# Patient Record
Sex: FEMALE | Race: WHITE | NOT HISPANIC OR LATINO | Employment: OTHER | URBAN - METROPOLITAN AREA
[De-identification: names, ages, dates, MRNs, and addresses within clinical notes are randomized per-mention and may not be internally consistent; named-entity substitution may affect disease eponyms.]

---

## 2017-03-06 ENCOUNTER — TRANSCRIBE ORDERS (OUTPATIENT)
Dept: ADMINISTRATIVE | Facility: HOSPITAL | Age: 77
End: 2017-03-06

## 2017-03-06 DIAGNOSIS — Z12.31 VISIT FOR SCREENING MAMMOGRAM: Primary | ICD-10-CM

## 2017-03-23 ENCOUNTER — APPOINTMENT (OUTPATIENT)
Dept: MAMMOGRAPHY | Facility: HOSPITAL | Age: 77
End: 2017-03-23

## 2017-04-12 ENCOUNTER — APPOINTMENT (OUTPATIENT)
Dept: MAMMOGRAPHY | Facility: HOSPITAL | Age: 77
End: 2017-04-12

## 2017-05-02 ENCOUNTER — APPOINTMENT (OUTPATIENT)
Dept: MAMMOGRAPHY | Facility: HOSPITAL | Age: 77
End: 2017-05-02

## 2017-05-15 ENCOUNTER — APPOINTMENT (OUTPATIENT)
Dept: MAMMOGRAPHY | Facility: HOSPITAL | Age: 77
End: 2017-05-15

## 2017-05-24 ENCOUNTER — APPOINTMENT (OUTPATIENT)
Dept: MAMMOGRAPHY | Facility: HOSPITAL | Age: 77
End: 2017-05-24

## 2017-06-09 ENCOUNTER — TRANSCRIBE ORDERS (OUTPATIENT)
Dept: ADMINISTRATIVE | Facility: HOSPITAL | Age: 77
End: 2017-06-09

## 2017-06-09 DIAGNOSIS — Z12.31 VISIT FOR SCREENING MAMMOGRAM: Primary | ICD-10-CM

## 2017-06-28 ENCOUNTER — HOSPITAL ENCOUNTER (OUTPATIENT)
Dept: MAMMOGRAPHY | Facility: HOSPITAL | Age: 77
Discharge: HOME | End: 2017-06-28
Admitting: INTERNAL MEDICINE

## 2017-06-28 DIAGNOSIS — Z12.31 VISIT FOR SCREENING MAMMOGRAM: ICD-10-CM

## 2017-06-28 PROCEDURE — G0202 SCR MAMMO BI INCL CAD: HCPCS

## 2017-06-28 PROCEDURE — 77063 BREAST TOMOSYNTHESIS BI: CPT

## 2017-06-28 PROCEDURE — 77063 BREAST TOMOSYNTHESIS BI: CPT | Performed by: RADIOLOGY

## 2017-06-28 PROCEDURE — G0202 SCR MAMMO BI INCL CAD: HCPCS | Performed by: RADIOLOGY

## 2018-03-13 ENCOUNTER — OFFICE VISIT (OUTPATIENT)
Dept: ORTHOPEDIC SURGERY | Facility: CLINIC | Age: 78
End: 2018-03-13

## 2018-03-13 VITALS
HEART RATE: 65 BPM | BODY MASS INDEX: 51.91 KG/M2 | SYSTOLIC BLOOD PRESSURE: 130 MMHG | WEIGHT: 293 LBS | DIASTOLIC BLOOD PRESSURE: 89 MMHG | HEIGHT: 63 IN

## 2018-03-13 DIAGNOSIS — M17.0 PRIMARY OSTEOARTHRITIS OF BOTH KNEES: Primary | ICD-10-CM

## 2018-03-13 DIAGNOSIS — R52 PAIN: ICD-10-CM

## 2018-03-13 PROCEDURE — 99204 OFFICE O/P NEW MOD 45 MIN: CPT | Performed by: ORTHOPAEDIC SURGERY

## 2018-03-13 PROCEDURE — 20610 DRAIN/INJ JOINT/BURSA W/O US: CPT | Performed by: ORTHOPAEDIC SURGERY

## 2018-03-13 RX ORDER — LOPERAMIDE HYDROCHLORIDE 2 MG/1
2 CAPSULE ORAL 4 TIMES DAILY PRN
COMMUNITY

## 2018-03-13 RX ORDER — WARFARIN SODIUM 5 MG/1
5 TABLET ORAL
COMMUNITY

## 2018-03-13 RX ORDER — PROPAFENONE HYDROCHLORIDE 225 MG/1
TABLET, FILM COATED ORAL
Refills: 2 | COMMUNITY
Start: 2018-03-08

## 2018-03-13 RX ORDER — ASPIRIN 81 MG/1
81 TABLET ORAL DAILY
COMMUNITY

## 2018-03-13 RX ORDER — IBUPROFEN 200 MG
200 TABLET ORAL EVERY 6 HOURS PRN
COMMUNITY

## 2018-03-13 RX ORDER — DOCUSATE SODIUM 100 MG/1
100 CAPSULE, LIQUID FILLED ORAL 2 TIMES DAILY
COMMUNITY

## 2018-03-13 RX ORDER — PANTOPRAZOLE SODIUM 40 MG/1
TABLET, DELAYED RELEASE ORAL
Refills: 1 | COMMUNITY
Start: 2017-12-18

## 2018-03-13 RX ORDER — ATORVASTATIN CALCIUM 40 MG/1
TABLET, FILM COATED ORAL
Refills: 2 | COMMUNITY
Start: 2017-12-18

## 2018-03-13 RX ORDER — UBIDECARENONE 100 MG
100 CAPSULE ORAL DAILY
COMMUNITY

## 2018-03-13 RX ORDER — NITROFURANTOIN MACROCRYSTALS 50 MG/1
CAPSULE ORAL
Refills: 5 | COMMUNITY
Start: 2018-03-08

## 2018-03-13 RX ORDER — PEPSIN/PAN ENZYME/BETAINE
TABLET ORAL
COMMUNITY

## 2018-03-13 RX ORDER — LIDOCAINE HYDROCHLORIDE 10 MG/ML
3 INJECTION, SOLUTION INFILTRATION; PERINEURAL
Status: COMPLETED | OUTPATIENT
Start: 2018-03-13 | End: 2018-03-13

## 2018-03-13 RX ORDER — HYDROCODONE BITARTRATE AND ACETAMINOPHEN 10; 325 MG/1; MG/1
TABLET ORAL
Refills: 0 | COMMUNITY
Start: 2018-02-15

## 2018-03-13 RX ORDER — POTASSIUM CHLORIDE 20 MEQ/1
TABLET, EXTENDED RELEASE ORAL
Refills: 10 | COMMUNITY
Start: 2018-03-01

## 2018-03-13 RX ORDER — CHOLECALCIFEROL (VITAMIN D3) 125 MCG
5 CAPSULE ORAL
COMMUNITY

## 2018-03-13 RX ORDER — LANOLIN ALCOHOL/MO/W.PET/CERES
1000 CREAM (GRAM) TOPICAL DAILY
COMMUNITY

## 2018-03-13 RX ORDER — FAMOTIDINE 20 MG/1
20 TABLET, FILM COATED ORAL 2 TIMES DAILY
COMMUNITY

## 2018-03-13 RX ORDER — METOPROLOL SUCCINATE 50 MG/1
TABLET, EXTENDED RELEASE ORAL
Refills: 10 | COMMUNITY
Start: 2018-02-12

## 2018-03-13 RX ORDER — VENLAFAXINE HYDROCHLORIDE 150 MG/1
CAPSULE, EXTENDED RELEASE ORAL
Refills: 3 | COMMUNITY
Start: 2018-02-19

## 2018-03-13 RX ORDER — TRIAMCINOLONE ACETONIDE 40 MG/ML
80 INJECTION, SUSPENSION INTRA-ARTICULAR; INTRAMUSCULAR
Status: COMPLETED | OUTPATIENT
Start: 2018-03-13 | End: 2018-03-13

## 2018-03-13 RX ORDER — BUMETANIDE 1 MG/1
TABLET ORAL
Refills: 11 | COMMUNITY
Start: 2018-01-15

## 2018-03-13 RX ADMIN — LIDOCAINE HYDROCHLORIDE 3 ML: 10 INJECTION, SOLUTION INFILTRATION; PERINEURAL at 15:55

## 2018-03-13 RX ADMIN — TRIAMCINOLONE ACETONIDE 80 MG: 40 INJECTION, SUSPENSION INTRA-ARTICULAR; INTRAMUSCULAR at 15:55

## 2018-03-13 RX ADMIN — LIDOCAINE HYDROCHLORIDE 3 ML: 10 INJECTION, SOLUTION INFILTRATION; PERINEURAL at 15:56

## 2018-03-13 RX ADMIN — TRIAMCINOLONE ACETONIDE 80 MG: 40 INJECTION, SUSPENSION INTRA-ARTICULAR; INTRAMUSCULAR at 15:56

## 2018-03-13 NOTE — PROGRESS NOTES
Procedure   Large Joint Arthrocentesis  Date/Time: 3/13/2018 3:56 PM  Consent given by: patient  Site marked: site marked  Timeout: Immediately prior to procedure a time out was called to verify the correct patient, procedure, equipment, support staff and site/side marked as required   Supporting Documentation  Indications: pain   Procedure Details  Location: knee - L knee  Preparation: Patient was prepped and draped in the usual sterile fashion  Needle size: 22 G  Approach: anteromedial  Medications administered: 3 mL lidocaine 1 %; 80 mg triamcinolone acetonide 40 MG/ML (Bupivacaine 0.25%, NDC: 4683-5765-34, LOT: [id number], EXP: 09/01/2019, 3cc)  Patient tolerance: patient tolerated the procedure well with no immediate complications

## 2018-03-13 NOTE — PROGRESS NOTES
Procedure   Large Joint Arthrocentesis  Date/Time: 3/13/2018 3:55 PM  Consent given by: patient  Site marked: site marked  Timeout: Immediately prior to procedure a time out was called to verify the correct patient, procedure, equipment, support staff and site/side marked as required   Supporting Documentation  Indications: pain   Procedure Details  Location: knee - R knee  Preparation: Patient was prepped and draped in the usual sterile fashion  Needle size: 22 G  Approach: anteromedial  Medications administered: 3 mL lidocaine 1 %; 80 mg triamcinolone acetonide 40 MG/ML (Bupivacaine 0.25%, NDC: 2962-2202-15, LOT: [id number], EXP: 09/01/2019, 3c)  Patient tolerance: patient tolerated the procedure well with no immediate complications

## 2018-03-13 NOTE — PROGRESS NOTES
Orthopaedic Clinic Note: Knee New Patient    Chief Complaint   Patient presents with   • Left Knee - Pain   • Right Knee - Pain        HPI    July Poe is a 77 y.o. female who presents with bilateral knee pain for 15 year(s). Onset Has been atraumatic and gradual in nature.  Pain is localized globally throughout both knees.  She rates it a 7/10 on the pain scale.  Her pain is worse with walking, standing, weightbearing activities.  Sitting and resting improve her pain.  Previous treatments have included Tylenol, anti-inflammatories, use of a walker, and prior knee injections.  Her last set of injections was approximately a year ago.  These were performed in [place].  There are cortisone injections which lasted approximately 6 months.  Her pain is now returned.  She is here today to discuss treatment options for her ongoing knee pain.  Past Medical History:   Diagnosis Date   • Atrial fibrillation    • Heart disease    • Hypertension       Past Surgical History:   Procedure Laterality Date   • HIP SURGERY Bilateral     x5 left, x2 right- [facility]   • HYSTERECTOMY      age 33   • KNEE SURGERY Left 2014    arthroscopy- [facility]   • OOPHORECTOMY      age 33   • SHOULDER SURGERY Bilateral       Family History   Problem Relation Age of Onset   • Breast cancer Sister      age unknown   • Breast cancer Sister      age unknown   • Stroke Father    • Ovarian cancer Neg Hx      Social History     Social History   • Marital status:      Spouse name: N/A   • Number of children: N/A   • Years of education: N/A     Occupational History   • Not on file.     Social History Main Topics   • Smoking status: Never Smoker   • Smokeless tobacco: Never Used   • Alcohol use No   • Drug use: No   • Sexual activity: Defer     Other Topics Concern   • Not on file     Social History Narrative   • No narrative on file      No current outpatient prescriptions on file prior to visit.     No current facility-administered medications on  "file prior to visit.       No Known Allergies     Review of Systems   Constitutional: Positive for appetite change and fatigue.   HENT: Negative.    Eyes: Negative.    Respiratory: Positive for apnea and shortness of breath.    Cardiovascular: Negative.    Gastrointestinal: Positive for anal bleeding.   Endocrine: Positive for cold intolerance.   Genitourinary: Positive for frequency and urgency.   Musculoskeletal: Positive for arthralgias.   Skin: Negative.    Allergic/Immunologic: Negative.    Neurological: Positive for dizziness and light-headedness.   Hematological: Bruises/bleeds easily.   Psychiatric/Behavioral: Negative.         The following portions of the patient's history were reviewed and updated as appropriate: allergies, current medications, past family history, past medical history, past social history, past surgical history and problem list.    Physical Exam  Blood pressure 130/89, pulse 65, height 160 cm (63\"), weight (!) 175 kg (386 lb 3.2 oz).    Body mass index is 68.41 kg/m².    GENERAL APPEARANCE: awake, alert & oriented x 3, in no acute distress, well developed, well nourished and obese  PSYCH: normal affect  LUNGS:  breathing nonlabored  EYES: sclera anicteric  CARDIOVASCULAR: palpable dorsalis pedis, palpable posterior tibial bilaterally. Capillary refill less than 2 seconds  EXTREMITIES: no clubbing, cyanosis  GAIT:  Not assessed             Right Lower Extremity Exam:   ----------  Hip Exam  ----------  FLEXION CONTRACTURE: None  FLEXION: 110 degrees  INTERNAL ROTATION: 20 degrees at 90 degrees of flexion   EXTERNAL ROTATION: 40 degrees at 90 degrees of flexion    PAIN WITH HIP MOTION: no  ----------  Knee Exam  ----------  ALIGNMENT: neutral    RANGE OF MOTION:  Normal (0-110 degrees) with no extensor lag or flexion contracture  LIGAMENTOUS STABILITY:   stable to varus and valgus stress at terminal extension and 30 degrees without any evidence of laxity     STRENGTH:  4/5 knee flexion, " extension. 5/5 ankle dorsiflexion and plantarflexion.     PAIN WITH PALPATION: global  KNEE EFFUSION: yes, mild effusion  PAIN WITH KNEE ROM: yes, global   PATELLAR CREPITUS: yes, painful and symptomatic  SPECIAL EXAM FINDINGS:  none    REFLEXES:  PATELLAR 2+/4  ACHILLES 2+/4    CLONUS: no  STRAIGHT LEG TEST:   negative    SENSATION TO LIGHT TOUCH:  DEEP PERONEAL/SUPERFICIAL PERONEAL/SURAL/SAPHENOUS/TIBIAL:   intact    EDEMA:  yes, pitting edema from mid tibia distally   ERYTHEMA:  no  WOUNDS/INCISIONS: no        Left Lower Extremity Exam:   ----------  Hip Exam  ----------  FLEXION CONTRACTURE: None  FLEXION: 110 degrees  INTERNAL ROTATION: 20 degrees at 90 degrees of flexion   EXTERNAL ROTATION: 40 degrees at 90 degrees of flexion    PAIN WITH HIP MOTION: no  ----------  Knee Exam  ----------  ALIGNMENT: 3 degrees varus, correctible to neutral    RANGE OF MOTION:  Normal (0-130 degrees) with no extensor lag or flexion contracture  LIGAMENTOUS STABILITY:   stable to varus and valgus stress at terminal extension and 30 degrees; slight retensioning of the MCL is appreciated with valgus stress at 30 degrees consistent with medial compartment degeneration     STRENGTH:  4/5 knee flexion, extension. 5/5 ankle dorsiflexion and plantarflexion.     PAIN WITH PALPATION: global  KNEE EFFUSION: yes, mild effusion  PAIN WITH KNEE ROM: yes, globally   PATELLAR CREPITUS: yes, painful and symptomatic  SPECIAL EXAM FINDINGS:  none    REFLEXES:  PATELLAR 2+/4  ACHILLES 2+/4    CLONUS: no  STRAIGHT LEG TEST:   negative    SENSATION TO LIGHT TOUCH:  DEEP PERONEAL/SUPERFICIAL PERONEAL/SURAL/SAPHENOUS/TIBIAL:   intact    EDEMA:  yes, pitting edema to mid-tibia   ERYTHEMA:  no  WOUNDS/INCISIONS: no      ______________________________________________________________________  ______________________________________________________________________    RADIOGRAPHIC FINDINGS:   Indication: Bilateral knee pain    Comparison: No prior xrays are  available for comparison    Right knee(s) 4 views: moderate to severe tricompartmental arthritis, periarticular osteophytes visualized in all compartments  Left knee(s) 4 views: severe tricompartmental arthritis with genu varum alignment and bone-on-bone articulation of medial compartment, periarticular osteophytes visualized in all compartments    Assessment/Plan:   Diagnosis Plan   1. Primary osteoarthritis of both knees  Large Joint Arthrocentesis    Large Joint Arthrocentesis   2. Pain  XR Knee 4+ View Bilateral   3. BMI 60.0-69.9, adult       Patient has severe osteoarthritis of the bilateral knees.  She is not a candidate for total joint arthroplasty as a result of her morbid obesity.  Furthermore she is not interested in surgical intervention.  We will proceed with cortisone injections the bilateral knees after discussion of multiple treatment options.  She is agreeable to this plan.  She'll follow up on an as-needed basis.    Procedure Note:  I discussed with the patient the potential benefits of performing a therapeutic injections of the bilateral knees as well as potential risks including but not limited to infection, swelling, pain, bleeding, bruising, nerve/vessel damage, skin color changes, transient elevation in blood glucose levels, and fat atrophy. After informed consent and after the areas were prepped with alcohol, ethyl chloride was used to numb the skin. Via the inferomedial approach, 3cc of 1% lidocaine, 3cc of 0.25% marcaine and 2 cc of 40mg/ml of Kenalog were each injected into the bilateral knees. The patient tolerated the procedures well. There were no complications. A sterile dressing was placed over each injection site.    Errol Oliver MD  03/13/18  4:15 PM

## 2018-12-26 ENCOUNTER — TRANSCRIBE ORDERS (OUTPATIENT)
Dept: ADMINISTRATIVE | Facility: HOSPITAL | Age: 78
End: 2018-12-26

## 2018-12-26 DIAGNOSIS — Z12.31 VISIT FOR SCREENING MAMMOGRAM: Primary | ICD-10-CM

## 2019-02-19 ENCOUNTER — HOSPITAL ENCOUNTER (OUTPATIENT)
Dept: MAMMOGRAPHY | Facility: HOSPITAL | Age: 79
Discharge: HOME | End: 2019-02-19
Admitting: INTERNAL MEDICINE

## 2019-02-19 DIAGNOSIS — Z12.31 VISIT FOR SCREENING MAMMOGRAM: ICD-10-CM

## 2019-02-19 PROCEDURE — 77067 SCR MAMMO BI INCL CAD: CPT

## 2019-02-19 PROCEDURE — 77063 BREAST TOMOSYNTHESIS BI: CPT

## 2019-02-19 PROCEDURE — 77067 SCR MAMMO BI INCL CAD: CPT | Performed by: RADIOLOGY

## 2019-02-19 PROCEDURE — 77063 BREAST TOMOSYNTHESIS BI: CPT | Performed by: RADIOLOGY

## 2020-01-20 ENCOUNTER — TRANSCRIBE ORDERS (OUTPATIENT)
Dept: ADMINISTRATIVE | Facility: HOSPITAL | Age: 80
End: 2020-01-20

## 2020-01-20 DIAGNOSIS — Z12.31 VISIT FOR SCREENING MAMMOGRAM: Primary | ICD-10-CM

## 2020-06-12 ENCOUNTER — APPOINTMENT (OUTPATIENT)
Dept: MAMMOGRAPHY | Facility: HOSPITAL | Age: 80
End: 2020-06-12

## 2020-08-28 ENCOUNTER — APPOINTMENT (OUTPATIENT)
Dept: MAMMOGRAPHY | Facility: HOSPITAL | Age: 80
End: 2020-08-28